# Patient Record
Sex: FEMALE | Race: WHITE | NOT HISPANIC OR LATINO | Employment: FULL TIME | URBAN - METROPOLITAN AREA
[De-identification: names, ages, dates, MRNs, and addresses within clinical notes are randomized per-mention and may not be internally consistent; named-entity substitution may affect disease eponyms.]

---

## 2023-08-31 ENCOUNTER — APPOINTMENT (EMERGENCY)
Dept: CT IMAGING | Facility: HOSPITAL | Age: 38
End: 2023-08-31
Payer: COMMERCIAL

## 2023-08-31 ENCOUNTER — HOSPITAL ENCOUNTER (EMERGENCY)
Facility: HOSPITAL | Age: 38
Discharge: HOME | End: 2023-09-01
Attending: EMERGENCY MEDICINE | Admitting: EMERGENCY MEDICINE
Payer: COMMERCIAL

## 2023-08-31 ENCOUNTER — OFFICE VISIT (OUTPATIENT)
Dept: URGENT CARE | Facility: CLINIC | Age: 38
End: 2023-08-31
Payer: COMMERCIAL

## 2023-08-31 VITALS
TEMPERATURE: 99.8 F | OXYGEN SATURATION: 98 % | WEIGHT: 144.6 LBS | HEIGHT: 62 IN | HEART RATE: 88 BPM | BODY MASS INDEX: 26.61 KG/M2 | RESPIRATION RATE: 18 BRPM

## 2023-08-31 VITALS
WEIGHT: 144.62 LBS | RESPIRATION RATE: 16 BRPM | TEMPERATURE: 98.2 F | HEIGHT: 62 IN | OXYGEN SATURATION: 98 % | HEART RATE: 71 BPM | DIASTOLIC BLOOD PRESSURE: 81 MMHG | BODY MASS INDEX: 26.61 KG/M2 | SYSTOLIC BLOOD PRESSURE: 137 MMHG

## 2023-08-31 DIAGNOSIS — R10.9 ABDOMINAL PAIN, UNSPECIFIED ABDOMINAL LOCATION: Primary | ICD-10-CM

## 2023-08-31 DIAGNOSIS — R10.9 ABDOMINAL PAIN: Primary | ICD-10-CM

## 2023-08-31 DIAGNOSIS — R93.5 ABNORMAL CT OF THE ABDOMEN: ICD-10-CM

## 2023-08-31 LAB
ALBUMIN SERPL BCP-MCNC: 4.1 G/DL (ref 3.5–5)
ALP SERPL-CCNC: 89 U/L (ref 34–104)
ALT SERPL W P-5'-P-CCNC: 19 U/L (ref 7–52)
ANION GAP SERPL CALCULATED.3IONS-SCNC: 9 MMOL/L
AST SERPL W P-5'-P-CCNC: 14 U/L (ref 13–39)
BASOPHILS # BLD AUTO: 0.03 THOUSANDS/ÂΜL (ref 0–0.1)
BASOPHILS NFR BLD AUTO: 0 % (ref 0–1)
BILIRUB SERPL-MCNC: 0.45 MG/DL (ref 0.2–1)
BUN SERPL-MCNC: 8 MG/DL (ref 5–25)
CALCIUM SERPL-MCNC: 9 MG/DL (ref 8.4–10.2)
CHLORIDE SERPL-SCNC: 105 MMOL/L (ref 96–108)
CO2 SERPL-SCNC: 22 MMOL/L (ref 21–32)
CREAT SERPL-MCNC: 0.64 MG/DL (ref 0.6–1.3)
EOSINOPHIL # BLD AUTO: 0.11 THOUSAND/ÂΜL (ref 0–0.61)
EOSINOPHIL NFR BLD AUTO: 1 % (ref 0–6)
ERYTHROCYTE [DISTWIDTH] IN BLOOD BY AUTOMATED COUNT: 12.2 % (ref 11.6–15.1)
EXT PREGNANCY TEST URINE: NEGATIVE
EXT. CONTROL: NORMAL
GFR SERPL CREATININE-BSD FRML MDRD: 115 ML/MIN/1.73SQ M
GLUCOSE SERPL-MCNC: 90 MG/DL (ref 65–140)
HCT VFR BLD AUTO: 43.1 % (ref 34.8–46.1)
HGB BLD-MCNC: 14.5 G/DL (ref 11.5–15.4)
IMM GRANULOCYTES # BLD AUTO: 0.03 THOUSAND/UL (ref 0–0.2)
IMM GRANULOCYTES NFR BLD AUTO: 0 % (ref 0–2)
LIPASE SERPL-CCNC: <6 U/L (ref 11–82)
LYMPHOCYTES # BLD AUTO: 0.67 THOUSANDS/ÂΜL (ref 0.6–4.47)
LYMPHOCYTES NFR BLD AUTO: 8 % (ref 14–44)
MCH RBC QN AUTO: 30.5 PG (ref 26.8–34.3)
MCHC RBC AUTO-ENTMCNC: 33.6 G/DL (ref 31.4–37.4)
MCV RBC AUTO: 91 FL (ref 82–98)
MONOCYTES # BLD AUTO: 0.72 THOUSAND/ÂΜL (ref 0.17–1.22)
MONOCYTES NFR BLD AUTO: 8 % (ref 4–12)
NEUTROPHILS # BLD AUTO: 7.23 THOUSANDS/ÂΜL (ref 1.85–7.62)
NEUTS SEG NFR BLD AUTO: 83 % (ref 43–75)
NRBC BLD AUTO-RTO: 0 /100 WBCS
PLATELET # BLD AUTO: 315 THOUSANDS/UL (ref 149–390)
PMV BLD AUTO: 8.9 FL (ref 8.9–12.7)
POTASSIUM SERPL-SCNC: 3.6 MMOL/L (ref 3.5–5.3)
PROT SERPL-MCNC: 7.2 G/DL (ref 6.4–8.4)
RBC # BLD AUTO: 4.76 MILLION/UL (ref 3.81–5.12)
SL AMB  POCT GLUCOSE, UA: NORMAL
SL AMB LEUKOCYTE ESTERASE,UA: NORMAL
SL AMB POCT BILIRUBIN,UA: NORMAL
SL AMB POCT BLOOD,UA: NORMAL
SL AMB POCT CLARITY,UA: CLEAR
SL AMB POCT COLOR,UA: NORMAL
SL AMB POCT KETONES,UA: NORMAL
SL AMB POCT NITRITE,UA: NORMAL
SL AMB POCT PH,UA: 6
SL AMB POCT SPECIFIC GRAVITY,UA: 1.02
SL AMB POCT URINE HCG: NORMAL
SL AMB POCT URINE PROTEIN: NORMAL
SL AMB POCT UROBILINOGEN: 0.2
SODIUM SERPL-SCNC: 136 MMOL/L (ref 135–147)
WBC # BLD AUTO: 8.79 THOUSAND/UL (ref 4.31–10.16)

## 2023-08-31 PROCEDURE — 74177 CT ABD & PELVIS W/CONTRAST: CPT

## 2023-08-31 PROCEDURE — 99285 EMERGENCY DEPT VISIT HI MDM: CPT | Performed by: PHYSICIAN ASSISTANT

## 2023-08-31 PROCEDURE — 99215 OFFICE O/P EST HI 40 MIN: CPT | Performed by: PHYSICIAN ASSISTANT

## 2023-08-31 PROCEDURE — 81002 URINALYSIS NONAUTO W/O SCOPE: CPT | Performed by: PHYSICIAN ASSISTANT

## 2023-08-31 PROCEDURE — 81025 URINE PREGNANCY TEST: CPT | Performed by: PHYSICIAN ASSISTANT

## 2023-08-31 PROCEDURE — 80053 COMPREHEN METABOLIC PANEL: CPT | Performed by: PHYSICIAN ASSISTANT

## 2023-08-31 PROCEDURE — 85025 COMPLETE CBC W/AUTO DIFF WBC: CPT | Performed by: PHYSICIAN ASSISTANT

## 2023-08-31 PROCEDURE — 83690 ASSAY OF LIPASE: CPT | Performed by: PHYSICIAN ASSISTANT

## 2023-08-31 PROCEDURE — 36415 COLL VENOUS BLD VENIPUNCTURE: CPT | Performed by: PHYSICIAN ASSISTANT

## 2023-08-31 PROCEDURE — 99284 EMERGENCY DEPT VISIT MOD MDM: CPT

## 2023-08-31 RX ORDER — PANTOPRAZOLE SODIUM 40 MG/1
40 TABLET, DELAYED RELEASE ORAL ONCE
Status: COMPLETED | OUTPATIENT
Start: 2023-09-01 | End: 2023-08-31

## 2023-08-31 RX ORDER — NORGESTIMATE AND ETHINYL ESTRADIOL 7DAYSX3 28
KIT ORAL
COMMUNITY
Start: 2023-08-30

## 2023-08-31 RX ORDER — DICYCLOMINE HCL 20 MG
20 TABLET ORAL ONCE
Status: COMPLETED | OUTPATIENT
Start: 2023-09-01 | End: 2023-08-31

## 2023-08-31 RX ORDER — METHOCARBAMOL 500 MG/1
500 TABLET, FILM COATED ORAL 3 TIMES DAILY PRN
Qty: 21 TABLET | Refills: 0 | Status: SHIPPED | OUTPATIENT
Start: 2023-08-31

## 2023-08-31 RX ORDER — DICYCLOMINE HCL 20 MG
20 TABLET ORAL EVERY 6 HOURS PRN
Qty: 20 TABLET | Refills: 0 | Status: SHIPPED | OUTPATIENT
Start: 2023-08-31

## 2023-08-31 RX ORDER — PANTOPRAZOLE SODIUM 40 MG/1
40 TABLET, DELAYED RELEASE ORAL DAILY
Qty: 30 TABLET | Refills: 0 | Status: SHIPPED | OUTPATIENT
Start: 2023-08-31

## 2023-08-31 RX ADMIN — IOHEXOL 100 ML: 350 INJECTION, SOLUTION INTRAVENOUS at 21:14

## 2023-08-31 RX ADMIN — DICYCLOMINE HYDROCHLORIDE 20 MG: 20 TABLET ORAL at 23:58

## 2023-08-31 RX ADMIN — PANTOPRAZOLE SODIUM 40 MG: 40 TABLET, DELAYED RELEASE ORAL at 23:58
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
## 2023-08-31 NOTE — PROGRESS NOTES
[facility]        NAME: Charley Fernandez is a 30 y.o. female  : 1985    MRN: [id number]  DATE: 2023  TIME: 7:34 PM      Assessment and Plan     Abdominal pain, unspecified abdominal location [R10.9]  1. Abdominal pain, unspecified abdominal location  POCT urine dip    POCT urine HCG    Transfer to other facility        Note:   U/A and Urine HCG in office negative. Sent patient to ER for further evaluation. Patient Instructions   There are no Patient Instructions on file for this visit. Follow up with PCP in 3-5 days. Go to ER if symptoms worsen. Chief Complaint     Chief Complaint   Patient presents with   • Abdominal Pain     Patient reported that she has been having abdominal pain for two weeks. Patient reported that it starts in her abd and radiates around to her back, then up her back. Patient reported that she notices she's been having difficult BM, tried taking a stool softner, which resulted in diarrhea. Patient reported that she has been gluten free for 3 years now, and believes she is gluten intolerant. She reports feeling nauseous. She also reports feeling lightheaded. History of Present Illness     Patient presents with abdominal pain x 2 weeks. She states the pain is lower and radiates to her lower back and up her spine. She states Advil, Aleve, and a heating pad were initially helping, but isn't now. She also tried a laxative, thinking it might be constipation. She states she feels bloated as well. The pain waxes and wanes in intensity and is not associated with meals. She also complains of nausea, feeling feverish, and sweats. She denies urinary complaints and vomiting. She states she is sexually active with one partner and is on birth control pills. Review of Systems     Review of Systems   Constitutional: Positive for diaphoresis and fever. Negative for chills and fatigue.    HENT: Negative for congestion, ear pain, postnasal drip, rhinorrhea, sinus pressure, sinus pain and sore throat. Eyes: Negative for pain and visual disturbance. Respiratory: Negative for cough, chest tightness and shortness of breath. Cardiovascular: Negative for chest pain and palpitations. Gastrointestinal: Positive for abdominal distention, abdominal pain and nausea. Negative for blood in stool, constipation, diarrhea and vomiting. Genitourinary: Negative for dysuria and hematuria. Musculoskeletal: Negative for arthralgias, back pain and myalgias. Skin: Negative for rash. Neurological: Negative for dizziness, seizures, syncope, numbness and headaches. All other systems reviewed and are negative. Current Medications       Current Outpatient Medications:   •  norgestimate-ethinyl estradiol (ORTHO TRI-CYCLEN,TRINESSA) 0.18/0.215/0.25 MG-35 MCG per tablet, , Disp: , Rfl:     Current Allergies     Allergies as of 08/31/2023   • (No Known Allergies)              The following portions of the patient's history were reviewed and updated as appropriate: allergies, current medications, past family history, past medical history, past social history, past surgical history, and problem list.     Past Medical History:   Diagnosis Date   • Gluten intolerance        Past Surgical History:   Procedure Laterality Date   • LAPAROSCOPIC OVARIAN CYSTECTOMY Right    • WISDOM TOOTH EXTRACTION         History reviewed. No pertinent family history. Medications have been verified. Objective     Pulse 88   Temp 99.8 °F (37.7 °C) (Tympanic)   Resp 18   Ht 5' 2" (1.575 m)   Wt 65.6 kg (144 lb 9.6 oz)   SpO2 98%   BMI 26.45 kg/m²   No LMP recorded. Physical Exam     Physical Exam  Vitals and nursing note reviewed. Constitutional:       Appearance: She is well-developed and normal weight. HENT:      Head: Normocephalic and atraumatic. Cardiovascular:      Rate and Rhythm: Normal rate and regular rhythm. Heart sounds: Normal heart sounds. Pulmonary:      Effort: Pulmonary effort is normal.      Breath sounds: Normal breath sounds. Abdominal:      General: Abdomen is flat. Bowel sounds are normal.      Palpations: Abdomen is soft. There is no hepatomegaly or splenomegaly. Tenderness: There is abdominal tenderness in the right lower quadrant, periumbilical area and left lower quadrant. There is no right CVA tenderness, left CVA tenderness, guarding or rebound. Positive signs include McBurney's sign. Negative signs include Sneed's sign, Rovsing's sign, psoas sign and obturator sign. Skin:     General: Skin is warm and dry. Neurological:      General: No focal deficit present. Mental Status: She is alert and oriented to person, place, and time.    Psychiatric:         Mood and Affect: Mood normal.         Behavior: Behavior normal.

## 2023-09-01 NOTE — DISCHARGE INSTRUCTIONS
Take pantoprazole as prescribed. Take Bentyl as needed for abdominal cramping. Take Robaxin as needed for spasms. Please call your family doctor tomorrow. You will need to see gastroenterology for further testing of your abnormal CT scan findings. Your CT results:  Irregular wall thickening involving the distal gastric body and antrum with adjacent inflammatory changes and lymphadenopathy as above. Findings may reflect infectious versus inflammatory gastritis. However in addition there are numerous tiny hypodense lesions throughout the liver and spleen of uncertain etiology; does the patient have history of granulomatous disease/TB? [facility] These were not reported on prior outside CT examination dated 5/12/2022 per review in the electronic medical record. Given   constellation of these findings underlying neoplastic etiologies i.e. gastric adenocarcinoma with metastatic disease versus? Lymphoma are in the differential amongst other considerations. GI consult and further clinical work-up advised.

## 2023-09-11 ENCOUNTER — OFFICE VISIT (OUTPATIENT)
Dept: GASTROENTEROLOGY | Facility: AMBULARY SURGERY CENTER | Age: 38
End: 2023-09-11
Payer: COMMERCIAL

## 2023-09-11 ENCOUNTER — APPOINTMENT (OUTPATIENT)
Dept: LAB | Facility: AMBULARY SURGERY CENTER | Age: 38
End: 2023-09-11
Payer: COMMERCIAL

## 2023-09-11 VITALS
OXYGEN SATURATION: 99 % | SYSTOLIC BLOOD PRESSURE: 128 MMHG | BODY MASS INDEX: 26.31 KG/M2 | HEIGHT: 62 IN | WEIGHT: 143 LBS | DIASTOLIC BLOOD PRESSURE: 84 MMHG | HEART RATE: 71 BPM

## 2023-09-11 DIAGNOSIS — K90.41 GLUTEN INTOLERANCE: ICD-10-CM

## 2023-09-11 DIAGNOSIS — R93.3 ABNORMAL CT SCAN, STOMACH: ICD-10-CM

## 2023-09-11 DIAGNOSIS — R10.9 ABDOMINAL PAIN: ICD-10-CM

## 2023-09-11 DIAGNOSIS — K76.9 LIVER LESION: ICD-10-CM

## 2023-09-11 DIAGNOSIS — D73.89 LESION OF SPLEEN: ICD-10-CM

## 2023-09-11 DIAGNOSIS — K90.41 GLUTEN INTOLERANCE: Primary | ICD-10-CM

## 2023-09-11 PROCEDURE — 82784 ASSAY IGA/IGD/IGG/IGM EACH: CPT

## 2023-09-11 PROCEDURE — 86364 TISS TRNSGLTMNASE EA IG CLAS: CPT

## 2023-09-11 PROCEDURE — 99244 OFF/OP CNSLTJ NEW/EST MOD 40: CPT | Performed by: INTERNAL MEDICINE

## 2023-09-11 PROCEDURE — 86258 DGP ANTIBODY EACH IG CLASS: CPT

## 2023-09-11 PROCEDURE — 86231 EMA EACH IG CLASS: CPT

## 2023-09-11 PROCEDURE — 36415 COLL VENOUS BLD VENIPUNCTURE: CPT

## 2023-09-11 RX ORDER — PANTOPRAZOLE SODIUM 40 MG/1
40 TABLET, DELAYED RELEASE ORAL DAILY
Qty: 90 TABLET | Refills: 1 | Status: SHIPPED | OUTPATIENT
Start: 2023-09-11 | End: 2023-12-10

## 2023-09-11 RX ORDER — LANSOPRAZOLE 30 MG/1
CAPSULE, DELAYED RELEASE ORAL
Refills: 0 | OUTPATIENT
Start: 2023-09-11

## 2023-09-11 NOTE — H&P (VIEW-ONLY)
Consultation - [facility]) [facility]  Perez Del Castillo 30 y.o. female MRN: [id number]  Unit/Bed#:  Encounter: [id number]        Consults    ASSESSMENT/PLAN:     1. Irregular appearance of the gastric antrum and body with adjacent inflammatory change in the lymphadenopathy: Secondary to peptic ulcer disease versus less likely malignancy.  -Would recommend urgent EGD. -Recommend to continue with pantoprazole 40 mg on daily basis, this appears to be effective.  -Avoid NSAIDs altogether.  -Follow non ulcerogenic diet.  -Check celiac serologies, patient reports being diagnosed with gluten intolerance however and has never been tested for celiac disease. 2.  Hypodense lesions throughout the liver and spleen: Possibly sarcoidosis versus tuberculosis versus microabscesses however this is unlikely given also present on CAT scan in May 2022. Patient has followed with [facility] hepatology last week and pulmonary and is scheduled for an MRI. -Follow-up the results of the MRI. -Follow-up with hepatology and pulmonary at [facility].      ______________________________________________________________________    Reason for Consult / Principal Problem: [unfilled]    HPI: Perez Del Castillo is a 30y.o. year old female with history of ovarian cystectomy, was recently in the hospital with abdominal pain. She underwent CT of abdomen and pelvis which was notable for irregular thickening of the distal gastric body and antrum with adjacent inflammatory changes and lymphadenopathy. Patient reports that she was experiencing epigastric abdominal pain for which she was taking Advil on Aleve around-the-clock. Patient reports that the pain got unbearable bringing her to the emergency room. She reports chronic use of NSAIDs for migraines. No melena or hematochezia. Patient reports that she was prescribed pantoprazole 40 mg in the ER and reports significant symptomatic improvement.   Patient reports that the pain has essentially subsided. She has not been taking NSAIDs. She has used Bentyl twice. Patient does report chronic constipation at baseline, does not take any fiber supplements, patient reports that she underwent colonoscopy 10 years ago for rectal bleeding which was essentially unremarkable. 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 On further review of her imaging, There is also evidence of small hypodense lesions throughout the liver and spleen of unknown etiology. These appear to be present since May 2022. Second read of the CAT scan was concerning for possible lymphomatous infiltrates versus multiple abscesses. MRI abdomen abdomen with and without contrast was recommended. This has not been done yet. Patient reports that she saw Pulmonary and hepatology at [facility] and has follow-up with him after the MRI. Patient reports having had a colonoscopy 10 years ago, does not recall having had a polyps. She has never had an EGD. Review of Systems: The remainder of the review of systems was negative except for the pertinent positives noted in HPI. Historical Information   Past Medical History:   Diagnosis Date    Gluten intolerance      Past Surgical History:   Procedure Laterality Date    LAPAROSCOPIC OVARIAN CYSTECTOMY Right     WISDOM TOOTH EXTRACTION       Social History   Social History     Substance and Sexual Activity   Alcohol Use Yes    Comment: occ     Social History     Substance and Sexual Activity   Drug Use Never     Social History     Tobacco Use   Smoking Status Never    Passive exposure: Past   Smokeless Tobacco Never     No family history on file. Meds/Allergies     (Not in a hospital admission)    No current facility-administered medications for this visit. No Known Allergies    Objective     There were no vitals taken for this visit.     [unfilled]    PHYSICAL EXAM     GEN: well nourished, well developed, no acute distress  HEENT: anicteric, MMM, no cervical or supraclavicular lymphadenopathy  CV: RRR, no m/r/g  CHEST: CTA b/l, no WRR  ABD: +BS, soft, NT/ND, no hepatosplenomegaly  EXT: no c/c/e  SKIN: no rashes,  NEURO: aaox3    Lab Results:   No visits with results within 1 Day(s) from this visit.    Latest known visit with results is:   Admission on 08/31/2023, Discharged on 09/01/2023   Component Date Value    WBC 08/31/2023 8.79     RBC 08/31/2023 4.76     Hemoglobin 08/31/2023 14.5     Hematocrit 08/31/2023 43.1     MCV 08/31/2023 91     MCH 08/31/2023 30.5     MCHC 08/31/2023 33.6     RDW 08/31/2023 12.2     MPV 08/31/2023 8.9     Platelets 73/54/5039 315     nRBC 08/31/2023 0     Neutrophils Relative 08/31/2023 83 (H)     Immat GRANS % 08/31/2023 0     Lymphocytes Relative 08/31/2023 8 (L)     Monocytes Relative 08/31/2023 8     Eosinophils Relative 08/31/2023 1     Basophils Relative 08/31/2023 0     Neutrophils Absolute 08/31/2023 7.23     Immature Grans Absolute 08/31/2023 0.03     Lymphocytes Absolute 08/31/2023 0.67     Monocytes Absolute 08/31/2023 0.72     Eosinophils Absolute 08/31/2023 0.11     Basophils Absolute 08/31/2023 0.03     Sodium 08/31/2023 136     Potassium 08/31/2023 3.6     Chloride 08/31/2023 105     CO2 08/31/2023 22     ANION GAP 08/31/2023 9     BUN 08/31/2023 8     Creatinine 08/31/2023 0.64     Glucose 08/31/2023 90     Calcium 08/31/2023 9.0     AST 08/31/2023 14     ALT 08/31/2023 19     Alkaline Phosphatase 08/31/2023 89     Total Protein 08/31/2023 7.2     Albumin 08/31/2023 4.1     Total Bilirubin 08/31/2023 0.45     eGFR 08/31/2023 115     Lipase 08/31/2023 <6 (L)     EXT Preg Test, Ur 08/31/2023 Negative     Control 08/31/2023 Valid      Imaging Studies: I have personally reviewed pertinent films in PACS                Answers submitted by the patient for this visit:  Abdominal Pain Questionnaire (Submitted on 9/11/2023)  Chief Complaint: Abdominal pain  Chronicity: new  Onset: 1 to 4 weeks ago  Onset quality: sudden  Frequency: constantly  Episode duration: 4 Weeks  Progression since onset: rapidly improving  Pain location: generalized abdominal region  Pain - numeric: 8/10  Pain quality: burning, cramping, a sensation of fullness, sharp  Radiates to: LLQ, LUQ, RLQ, RUQ, epigastric region, periumbilical region, suprapubic region, back  anorexia: Yes  arthralgias: No  belching: Yes  constipation: Yes  diarrhea: Yes  dysuria: No  fever: No  flatus: No  frequency: Yes  headaches: Yes  hematochezia: No  hematuria: No  melena: No  myalgias: No  nausea: Yes  weight loss: No  vomiting: No  Aggravated by: being still, certain positions  Relieved by: activity, belching, bowel movements, certain positions, eating, movement, palpation, passing flatus, recumbency, sitting up, standing  Diagnostic workup: CT scan

## 2023-09-11 NOTE — PROGRESS NOTES
Consultation - [address]  [facility]  Arsen Rojas 30 y.o. female MRN: [id number]  Unit/Bed#:  Encounter: [id number]        Consults    ASSESSMENT/PLAN:     1. Irregular appearance of the gastric antrum and body with adjacent inflammatory change in the lymphadenopathy: Secondary to peptic ulcer disease versus less likely malignancy.  -Would recommend urgent EGD. -Recommend to continue with pantoprazole 40 mg on daily basis, this appears to be effective.  -Avoid NSAIDs altogether.  -Follow non ulcerogenic diet.  -Check celiac serologies, patient reports being diagnosed with gluten intolerance however and has never been tested for celiac disease. 2.  Hypodense lesions throughout the liver and spleen: Possibly sarcoidosis versus tuberculosis versus microabscesses however this is unlikely given also present on CAT scan in May 2022. Patient has followed with [facility] hepatology last week and pulmonary and is scheduled for an MRI. -Follow-up the results of the MRI. -Follow-up with hepatology and pulmonary at [facility].      ______________________________________________________________________    Reason for Consult / Principal Problem: [unfilled]    HPI: Arsen Rojas is a 30y.o. year old female with history of ovarian cystectomy, was recently in the hospital with abdominal pain. She underwent CT of abdomen and pelvis which was notable for irregular thickening of the distal gastric body and antrum with adjacent inflammatory changes and lymphadenopathy. Patient reports that she was experiencing epigastric abdominal pain for which she was taking Advil on Aleve around-the-clock. Patient reports that the pain got unbearable bringing her to the emergency room. She reports chronic use of NSAIDs for migraines. No melena or hematochezia. Patient reports that she was prescribed pantoprazole 40 mg in the ER and reports significant symptomatic improvement.   Patient reports that the pain has essentially subsided. She has not been taking NSAIDs. She has used Bentyl twice. Patient does report chronic constipation at baseline, does not take any fiber supplements, patient reports that she underwent colonoscopy 10 years ago for rectal bleeding which was essentially unremarkable. 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 On further review of her imaging, There is also evidence of small hypodense lesions throughout the liver and spleen of unknown etiology. These appear to be present since May 2022. Second read of the CAT scan was concerning for possible lymphomatous infiltrates versus multiple abscesses. MRI abdomen abdomen with and without contrast was recommended. This has not been done yet. Patient reports that she saw Pulmonary and hepatology at [facility] and has follow-up with him after the MRI. Patient reports having had a colonoscopy 10 years ago, does not recall having had a polyps. She has never had an EGD. Review of Systems: The remainder of the review of systems was negative except for the pertinent positives noted in HPI. Historical Information   Past Medical History:   Diagnosis Date    Gluten intolerance      Past Surgical History:   Procedure Laterality Date    LAPAROSCOPIC OVARIAN CYSTECTOMY Right     WISDOM TOOTH EXTRACTION       Social History   Social History     Substance and Sexual Activity   Alcohol Use Yes    Comment: occ     Social History     Substance and Sexual Activity   Drug Use Never     Social History     Tobacco Use   Smoking Status Never    Passive exposure: Past   Smokeless Tobacco Never     No family history on file. Meds/Allergies     (Not in a hospital admission)    No current facility-administered medications for this visit. No Known Allergies    Objective     There were no vitals taken for this visit.     [unfilled]    PHYSICAL EXAM     GEN: well nourished, well developed, no acute distress  HEENT: anicteric, MMM, no cervical or supraclavicular lymphadenopathy  CV: RRR, no m/r/g  CHEST: CTA b/l, no WRR  ABD: +BS, soft, NT/ND, no hepatosplenomegaly  EXT: no c/c/e  SKIN: no rashes,  NEURO: aaox3    Lab Results:   No visits with results within 1 Day(s) from this visit.    Latest known visit with results is:   Admission on 08/31/2023, Discharged on 09/01/2023   Component Date Value    WBC 08/31/2023 8.79     RBC 08/31/2023 4.76     Hemoglobin 08/31/2023 14.5     Hematocrit 08/31/2023 43.1     MCV 08/31/2023 91     MCH 08/31/2023 30.5     MCHC 08/31/2023 33.6     RDW 08/31/2023 12.2     MPV 08/31/2023 8.9     Platelets 98/20/2037 315     nRBC 08/31/2023 0     Neutrophils Relative 08/31/2023 83 (H)     Immat GRANS % 08/31/2023 0     Lymphocytes Relative 08/31/2023 8 (L)     Monocytes Relative 08/31/2023 8     Eosinophils Relative 08/31/2023 1     Basophils Relative 08/31/2023 0     Neutrophils Absolute 08/31/2023 7.23     Immature Grans Absolute 08/31/2023 0.03     Lymphocytes Absolute 08/31/2023 0.67     Monocytes Absolute 08/31/2023 0.72     Eosinophils Absolute 08/31/2023 0.11     Basophils Absolute 08/31/2023 0.03     Sodium 08/31/2023 136     Potassium 08/31/2023 3.6     Chloride 08/31/2023 105     CO2 08/31/2023 22     ANION GAP 08/31/2023 9     BUN 08/31/2023 8     Creatinine 08/31/2023 0.64     Glucose 08/31/2023 90     Calcium 08/31/2023 9.0     AST 08/31/2023 14     ALT 08/31/2023 19     Alkaline Phosphatase 08/31/2023 89     Total Protein 08/31/2023 7.2     Albumin 08/31/2023 4.1     Total Bilirubin 08/31/2023 0.45     eGFR 08/31/2023 115     Lipase 08/31/2023 <6 (L)     EXT Preg Test, Ur 08/31/2023 Negative     Control 08/31/2023 Valid      Imaging Studies: I have personally reviewed pertinent films in PACS                Answers submitted by the patient for this visit:  Abdominal Pain Questionnaire (Submitted on 9/11/2023)  Chief Complaint: Abdominal pain  Chronicity: new  Onset: 1 to 4 weeks ago  Onset quality: sudden  Frequency: constantly  Episode duration: 4 Weeks  Progression since onset: rapidly improving  Pain location: generalized abdominal region  Pain - numeric: 8/10  Pain quality: burning, cramping, a sensation of fullness, sharp  Radiates to: LLQ, LUQ, RLQ, RUQ, epigastric region, periumbilical region, suprapubic region, back  anorexia: Yes  arthralgias: No  belching: Yes  constipation: Yes  diarrhea: Yes  dysuria: No  fever: No  flatus: No  frequency: Yes  headaches: Yes  hematochezia: No  hematuria: No  melena: No  myalgias: No  nausea: Yes  weight loss: No  vomiting: No  Aggravated by: being still, certain positions  Relieved by: activity, belching, bowel movements, certain positions, eating, movement, palpation, passing flatus, recumbency, sitting up, standing  Diagnostic workup: CT scan

## 2023-09-11 NOTE — PATIENT INSTRUCTIONS
Scheduled date of EGD(as of today):  09/15/23  Physician performing EGD:  Dr Anoop Zhang  Location of EGD:  [facility]   Instructions reviewed with patient by:  Pam SOUTH  Clearances:  n/a

## 2023-09-12 LAB
ENDOMYSIUM IGA SER QL: NEGATIVE
GLIADIN PEPTIDE IGA SER-ACNC: 8 UNITS (ref 0–19)
GLIADIN PEPTIDE IGG SER-ACNC: 6 UNITS (ref 0–19)
IGA SERPL-MCNC: 109 MG/DL (ref 87–352)
TTG IGA SER-ACNC: <2 U/ML (ref 0–3)
TTG IGG SER-ACNC: 8 U/ML (ref 0–5)

## 2023-09-13 ENCOUNTER — OFFICE VISIT (OUTPATIENT)
Dept: URGENT CARE | Facility: CLINIC | Age: 38
End: 2023-09-13
Payer: COMMERCIAL

## 2023-09-13 VITALS — HEART RATE: 84 BPM | OXYGEN SATURATION: 99 % | RESPIRATION RATE: 17 BRPM

## 2023-09-13 DIAGNOSIS — N39.0 URINARY TRACT INFECTION WITH HEMATURIA, SITE UNSPECIFIED: Primary | ICD-10-CM

## 2023-09-13 DIAGNOSIS — R39.9 UTI SYMPTOMS: ICD-10-CM

## 2023-09-13 DIAGNOSIS — R31.9 URINARY TRACT INFECTION WITH HEMATURIA, SITE UNSPECIFIED: Primary | ICD-10-CM

## 2023-09-13 LAB
SL AMB  POCT GLUCOSE, UA: ABNORMAL
SL AMB LEUKOCYTE ESTERASE,UA: ABNORMAL
SL AMB POCT BILIRUBIN,UA: ABNORMAL
SL AMB POCT BLOOD,UA: ABNORMAL
SL AMB POCT CLARITY,UA: CLEAR
SL AMB POCT COLOR,UA: ABNORMAL
SL AMB POCT KETONES,UA: ABNORMAL
SL AMB POCT NITRITE,UA: ABNORMAL
SL AMB POCT PH,UA: 6
SL AMB POCT SPECIFIC GRAVITY,UA: 1.01
SL AMB POCT URINE PROTEIN: ABNORMAL
SL AMB POCT UROBILINOGEN: 0.2

## 2023-09-13 PROCEDURE — 87186 SC STD MICRODIL/AGAR DIL: CPT | Performed by: PHYSICIAN ASSISTANT

## 2023-09-13 PROCEDURE — 87077 CULTURE AEROBIC IDENTIFY: CPT | Performed by: PHYSICIAN ASSISTANT

## 2023-09-13 PROCEDURE — 81002 URINALYSIS NONAUTO W/O SCOPE: CPT | Performed by: PHYSICIAN ASSISTANT

## 2023-09-13 PROCEDURE — 87086 URINE CULTURE/COLONY COUNT: CPT | Performed by: PHYSICIAN ASSISTANT

## 2023-09-13 PROCEDURE — 99213 OFFICE O/P EST LOW 20 MIN: CPT | Performed by: PHYSICIAN ASSISTANT

## 2023-09-13 RX ORDER — NITROFURANTOIN 25; 75 MG/1; MG/1
100 CAPSULE ORAL 2 TIMES DAILY
Qty: 14 CAPSULE | Refills: 0 | Status: SHIPPED | OUTPATIENT
Start: 2023-09-13 | End: 2023-09-20

## 2023-09-13 NOTE — PROGRESS NOTES
[facility]        NAME: Shaji Bender is a 30 y.o. female  : 1985    MRN: [id number]  DATE: 2023  TIME: 6:16 PM    Assessment and Plan   Urinary tract infection with hematuria, site unspecified [N39.0, R31.9]  1. Urinary tract infection with hematuria, site unspecified  nitrofurantoin (MACROBID) 100 mg capsule      2. UTI symptoms  POCT urine dip    Urine culture        - POCT urine dip revealed trace leukocytes, positive nitrites, and large blood  - Urine culture sent   - Will begin treatment with Macrobid and modify if needed based on urine culture results     Patient Instructions       Follow up with PCP in 3-5 days. Proceed to  ER if symptoms worsen. Chief Complaint     Chief Complaint   Patient presents with   • Possible UTI         History of Present Illness       Patient is a 29 yo female who presents for evaluation of urinary discomfort and urgency x 2 days. She is currently being worked up by GI for abdominal pain she had been having. She denies fevers, chills, nausea, vomiting, abdominal pain, pelvic pain, flank pain. Review of Systems   Review of Systems   Constitutional: Negative for chills and fever. Gastrointestinal: Negative for abdominal pain, nausea and vomiting. Genitourinary: Negative for flank pain and pelvic pain.          Current Medications       Current Outpatient Medications:   •  nitrofurantoin (MACROBID) 100 mg capsule, Take 1 capsule (100 mg total) by mouth 2 (two) times a day for 7 days, Disp: 14 capsule, Rfl: 0  •  dicyclomine (BENTYL) 20 mg tablet, Take 1 tablet (20 mg total) by mouth every 6 (six) hours as needed (abdominal cramping) (Patient not taking: Reported on 2023), Disp: 20 tablet, Rfl: 0  •  methocarbamol (ROBAXIN) 500 mg tablet, Take 1 tablet (500 mg total) by mouth 3 (three) times a day as needed for muscle spasms (Patient not taking: Reported on 2023), Disp: 21 tablet, Rfl: 0  •  norgestimate-ethinyl estradiol (ORTHO TRI-CYCLEN,TRINESSA) 0.18/0.215/0.25 MG-35 MCG per tablet, , Disp: , Rfl:   •  pantoprazole (PROTONIX) 40 mg tablet, Take 1 tablet (40 mg total) by mouth daily, Disp: 90 tablet, Rfl: 1    Current Allergies     Allergies as of 09/13/2023 - Reviewed 09/11/2023   Allergen Reaction Noted   • Actical GI Intolerance, Itching, Rash, and Swelling 07/07/2017   • Gluten meal - food allergy Abdominal Pain, GI Intolerance, Itching, Rash, and Swelling 06/07/2017            The following portions of the patient's history were reviewed and updated as appropriate: allergies, current medications, past family history, past medical history, past social history, past surgical history and problem list.     Past Medical History:   Diagnosis Date   • Gluten intolerance        Past Surgical History:   Procedure Laterality Date   • COLONOSCOPY     • LAPAROSCOPIC OVARIAN CYSTECTOMY Right    • WISDOM TOOTH EXTRACTION         No family history on file. Medications have been verified. Objective   Pulse 84   Resp 17   SpO2 99%        Physical Exam     Physical Exam  Constitutional:       General: She is not in acute distress. Appearance: She is not toxic-appearing. Cardiovascular:      Rate and Rhythm: Normal rate. Pulmonary:      Effort: Pulmonary effort is normal.   Abdominal:      Palpations: Abdomen is soft. Tenderness: There is no abdominal tenderness. There is no right CVA tenderness, left CVA tenderness or guarding. Skin:     General: Skin is warm and dry. Neurological:      Mental Status: She is alert.    Psychiatric:         Mood and Affect: Mood normal.         Behavior: Behavior normal.

## 2023-09-15 ENCOUNTER — ANESTHESIA (OUTPATIENT)
Dept: GASTROENTEROLOGY | Facility: AMBULARY SURGERY CENTER | Age: 38
End: 2023-09-15

## 2023-09-15 ENCOUNTER — HOSPITAL ENCOUNTER (OUTPATIENT)
Dept: GASTROENTEROLOGY | Facility: AMBULARY SURGERY CENTER | Age: 38
Setting detail: OUTPATIENT SURGERY
End: 2023-09-15
Attending: INTERNAL MEDICINE
Payer: COMMERCIAL

## 2023-09-15 ENCOUNTER — ANESTHESIA EVENT (OUTPATIENT)
Dept: GASTROENTEROLOGY | Facility: AMBULARY SURGERY CENTER | Age: 38
End: 2023-09-15

## 2023-09-15 VITALS
HEIGHT: 62 IN | TEMPERATURE: 97.3 F | HEART RATE: 70 BPM | RESPIRATION RATE: 20 BRPM | OXYGEN SATURATION: 99 % | SYSTOLIC BLOOD PRESSURE: 130 MMHG | DIASTOLIC BLOOD PRESSURE: 67 MMHG | BODY MASS INDEX: 26.5 KG/M2 | WEIGHT: 144 LBS

## 2023-09-15 DIAGNOSIS — R93.3 ABNORMAL CT SCAN, STOMACH: ICD-10-CM

## 2023-09-15 DIAGNOSIS — R10.9 ABDOMINAL PAIN: ICD-10-CM

## 2023-09-15 LAB
BACTERIA UR CULT: ABNORMAL
EXT PREGNANCY TEST URINE: NEGATIVE
EXT. CONTROL: NORMAL

## 2023-09-15 PROCEDURE — 81025 URINE PREGNANCY TEST: CPT | Performed by: INTERNAL MEDICINE

## 2023-09-15 PROCEDURE — 88305 TISSUE EXAM BY PATHOLOGIST: CPT | Performed by: PATHOLOGY

## 2023-09-15 RX ORDER — LIDOCAINE HYDROCHLORIDE 10 MG/ML
INJECTION, SOLUTION EPIDURAL; INFILTRATION; INTRACAUDAL; PERINEURAL AS NEEDED
Status: DISCONTINUED | OUTPATIENT
Start: 2023-09-15 | End: 2023-09-15

## 2023-09-15 RX ORDER — SIMETHICONE 20 MG/.3ML
EMULSION ORAL AS NEEDED
Status: COMPLETED | OUTPATIENT
Start: 2023-09-15 | End: 2023-09-15

## 2023-09-15 RX ORDER — PROPOFOL 10 MG/ML
INJECTION, EMULSION INTRAVENOUS AS NEEDED
Status: DISCONTINUED | OUTPATIENT
Start: 2023-09-15 | End: 2023-09-15

## 2023-09-15 RX ORDER — SODIUM CHLORIDE, SODIUM LACTATE, POTASSIUM CHLORIDE, CALCIUM CHLORIDE 600; 310; 30; 20 MG/100ML; MG/100ML; MG/100ML; MG/100ML
INJECTION, SOLUTION INTRAVENOUS CONTINUOUS PRN
Status: DISCONTINUED | OUTPATIENT
Start: 2023-09-15 | End: 2023-09-15

## 2023-09-15 RX ADMIN — LIDOCAINE HYDROCHLORIDE 50 MG: 10 INJECTION, SOLUTION EPIDURAL; INFILTRATION; INTRACAUDAL; PERINEURAL at 09:52

## 2023-09-15 RX ADMIN — PROPOFOL 50 MG: 10 INJECTION, EMULSION INTRAVENOUS at 09:56

## 2023-09-15 RX ADMIN — PROPOFOL 170 MG: 10 INJECTION, EMULSION INTRAVENOUS at 09:52

## 2023-09-15 RX ADMIN — SODIUM CHLORIDE, SODIUM LACTATE, POTASSIUM CHLORIDE, AND CALCIUM CHLORIDE: .6; .31; .03; .02 INJECTION, SOLUTION INTRAVENOUS at 09:10

## 2023-09-15 RX ADMIN — Medication 40 MG: at 09:55

## 2023-09-15 NOTE — ANESTHESIA PREPROCEDURE EVALUATION
Procedure:  EGD    Relevant Problems   No relevant active problems        Physical Exam    Airway    Mallampati score: II  TM Distance: >3 FB  Neck ROM: full     Dental   No notable dental hx     Cardiovascular  Cardiovascular exam normal    Pulmonary  Pulmonary exam normal     Other Findings        Anesthesia Plan  ASA Score- 2     Anesthesia Type- IV sedation with anesthesia with ASA Monitors. Additional Monitors:   Airway Plan:           Plan Factors-Exercise tolerance (METS): >4 METS. Chart reviewed. Patient summary reviewed. Patient is not a current smoker. Induction- intravenous. Postoperative Plan-     Informed Consent- Anesthetic plan and risks discussed with patient.

## 2023-09-15 NOTE — ANESTHESIA POSTPROCEDURE EVALUATION
Post-Op Assessment Note    CV Status:  Stable  Pain Score: 0    Pain management: adequate     Mental Status:  Sleepy   Hydration Status:  Euvolemic   PONV Controlled:  Controlled   Airway Patency:  Patent      Post Op Vitals Reviewed: Yes      Staff: Anesthesiologist, CRNA         No notable events documented.     /80 (09/15/23 0959)    Temp      Pulse 98 (09/15/23 0959)   Resp 12 (09/15/23 0959)    SpO2 99 % (09/15/23 0959)

## 2023-09-15 NOTE — INTERVAL H&P NOTE
H&P reviewed. After examining the patient I find no changes in the patients condition since the H&P had been written.     Vitals:    09/15/23 0914   BP: 133/79   Pulse: 68   Resp: 16   Temp: 97.8 °F (36.6 °C)   SpO2: 99%

## 2023-09-19 PROCEDURE — 88305 TISSUE EXAM BY PATHOLOGIST: CPT | Performed by: PATHOLOGY

## 2023-09-20 ENCOUNTER — HOSPITAL ENCOUNTER (OUTPATIENT)
Dept: MRI IMAGING | Facility: HOSPITAL | Age: 38
Discharge: HOME | End: 2023-09-20
Payer: COMMERCIAL

## 2023-09-20 DIAGNOSIS — K76.9 LIVER LESION: ICD-10-CM

## 2023-09-20 PROCEDURE — 74183 MRI ABD W/O CNTR FLWD CNTR: CPT

## 2023-09-20 PROCEDURE — G1004 CDSM NDSC: HCPCS

## 2023-09-20 PROCEDURE — A9585 GADOBUTROL INJECTION: HCPCS | Performed by: RADIOLOGY

## 2023-09-20 RX ORDER — GADOBUTROL 604.72 MG/ML
6 INJECTION INTRAVENOUS
Status: COMPLETED | OUTPATIENT
Start: 2023-09-20 | End: 2023-09-20

## 2023-09-20 RX ADMIN — GADOBUTROL 6 ML: 604.72 INJECTION INTRAVENOUS at 06:58

## 2023-09-21 ENCOUNTER — TELEPHONE (OUTPATIENT)
Dept: GASTROENTEROLOGY | Facility: CLINIC | Age: 38
End: 2023-09-21

## 2023-09-21 NOTE — TELEPHONE ENCOUNTER
----- Message from Gary Bay MD sent at 9/20/2023  4:45 PM EDT -----  Repeat EGD in 2 to 3 months for large duodenal ulcer.

## 2023-09-25 NOTE — TELEPHONE ENCOUNTER
I lmom for pt to please call back to schedule a repeat EGD with Dr. Andreas Marcus. Will call pt again in one week if do not hear back from her.

## 2023-09-27 ENCOUNTER — TELEPHONE (OUTPATIENT)
Age: 38
End: 2023-09-27

## 2023-09-27 NOTE — TELEPHONE ENCOUNTER
Scheduled date of EGD(as of today): 11/30/2023  Physician performing EGD: DR BEATTY [facility]  Location of EGD[facility]

## 2023-11-16 ENCOUNTER — ANESTHESIA (OUTPATIENT)
Dept: ANESTHESIOLOGY | Facility: HOSPITAL | Age: 38
End: 2023-11-16

## 2023-11-16 ENCOUNTER — ANESTHESIA EVENT (OUTPATIENT)
Dept: ANESTHESIOLOGY | Facility: HOSPITAL | Age: 38
End: 2023-11-16

## 2023-11-30 ENCOUNTER — ANESTHESIA (OUTPATIENT)
Dept: GASTROENTEROLOGY | Facility: AMBULARY SURGERY CENTER | Age: 38
End: 2023-11-30

## 2023-11-30 ENCOUNTER — ANESTHESIA EVENT (OUTPATIENT)
Dept: GASTROENTEROLOGY | Facility: AMBULARY SURGERY CENTER | Age: 38
End: 2023-11-30

## 2023-11-30 ENCOUNTER — HOSPITAL ENCOUNTER (OUTPATIENT)
Dept: GASTROENTEROLOGY | Facility: AMBULARY SURGERY CENTER | Age: 38
Setting detail: OUTPATIENT SURGERY
End: 2023-11-30
Attending: INTERNAL MEDICINE
Payer: COMMERCIAL

## 2023-11-30 VITALS
OXYGEN SATURATION: 100 % | BODY MASS INDEX: 26.31 KG/M2 | RESPIRATION RATE: 18 BRPM | HEIGHT: 62 IN | WEIGHT: 143 LBS | TEMPERATURE: 98.8 F | SYSTOLIC BLOOD PRESSURE: 133 MMHG | DIASTOLIC BLOOD PRESSURE: 78 MMHG | HEART RATE: 62 BPM

## 2023-11-30 DIAGNOSIS — K26.9 DUODENAL ULCER: ICD-10-CM

## 2023-11-30 PROBLEM — M25.50 ARTHRALGIA: Status: ACTIVE | Noted: 2023-10-09

## 2023-11-30 PROBLEM — R59.0 ABDOMINAL LYMPHADENOPATHY: Status: ACTIVE | Noted: 2023-09-01

## 2023-11-30 PROBLEM — K31.89 GASTRIC WALL THICKENING: Status: ACTIVE | Noted: 2023-09-01

## 2023-11-30 PROBLEM — G43.109 MIGRAINE WITH AURA: Status: ACTIVE | Noted: 2023-11-30

## 2023-11-30 PROBLEM — R14.0 ABDOMINAL BLOATING: Status: ACTIVE | Noted: 2023-09-01

## 2023-11-30 PROBLEM — R10.9 ABDOMINAL PAIN: Status: ACTIVE | Noted: 2023-09-01

## 2023-11-30 PROBLEM — R59.0 MEDIASTINAL LYMPHADENOPATHY: Status: ACTIVE | Noted: 2023-11-16

## 2023-11-30 PROBLEM — D86.9 SARCOIDOSIS: Status: ACTIVE | Noted: 2023-10-09

## 2023-11-30 PROBLEM — K25.9 GASTRIC ULCER: Status: ACTIVE | Noted: 2023-10-09

## 2023-11-30 PROBLEM — R93.2 ABNORMAL LIVER CT: Status: ACTIVE | Noted: 2023-09-01

## 2023-11-30 LAB
EXT PREGNANCY TEST URINE: NEGATIVE
EXT. CONTROL: NORMAL

## 2023-11-30 PROCEDURE — 88305 TISSUE EXAM BY PATHOLOGIST: CPT | Performed by: PATHOLOGY

## 2023-11-30 PROCEDURE — 81025 URINE PREGNANCY TEST: CPT | Performed by: ANESTHESIOLOGY

## 2023-11-30 PROCEDURE — 43239 EGD BIOPSY SINGLE/MULTIPLE: CPT | Performed by: INTERNAL MEDICINE

## 2023-11-30 RX ORDER — SODIUM CHLORIDE, SODIUM LACTATE, POTASSIUM CHLORIDE, CALCIUM CHLORIDE 600; 310; 30; 20 MG/100ML; MG/100ML; MG/100ML; MG/100ML
INJECTION, SOLUTION INTRAVENOUS CONTINUOUS PRN
Status: DISCONTINUED | OUTPATIENT
Start: 2023-11-30 | End: 2023-11-30

## 2023-11-30 RX ORDER — PROPOFOL 10 MG/ML
INJECTION, EMULSION INTRAVENOUS AS NEEDED
Status: DISCONTINUED | OUTPATIENT
Start: 2023-11-30 | End: 2023-11-30

## 2023-11-30 RX ORDER — LIDOCAINE HYDROCHLORIDE 10 MG/ML
INJECTION, SOLUTION EPIDURAL; INFILTRATION; INTRACAUDAL; PERINEURAL AS NEEDED
Status: DISCONTINUED | OUTPATIENT
Start: 2023-11-30 | End: 2023-11-30

## 2023-11-30 RX ADMIN — SODIUM CHLORIDE, SODIUM LACTATE, POTASSIUM CHLORIDE, AND CALCIUM CHLORIDE: .6; .31; .03; .02 INJECTION, SOLUTION INTRAVENOUS at 14:05

## 2023-11-30 RX ADMIN — LIDOCAINE HYDROCHLORIDE 50 MG: 10 INJECTION, SOLUTION EPIDURAL; INFILTRATION; INTRACAUDAL; PERINEURAL at 14:05

## 2023-11-30 RX ADMIN — PROPOFOL 50 MG: 10 INJECTION, EMULSION INTRAVENOUS at 14:09

## 2023-11-30 RX ADMIN — PROPOFOL 150 MG: 10 INJECTION, EMULSION INTRAVENOUS at 14:05

## 2023-11-30 RX ADMIN — PROPOFOL 50 MG: 10 INJECTION, EMULSION INTRAVENOUS at 14:06

## 2023-11-30 RX ADMIN — PROPOFOL 50 MG: 10 INJECTION, EMULSION INTRAVENOUS at 14:08

## 2023-11-30 NOTE — ANESTHESIA POSTPROCEDURE EVALUATION
Post-Op Assessment Note    CV Status:  Stable  Pain Score: 0    Pain management: adequate       Mental Status:  Alert and awake   Hydration Status:  Euvolemic   PONV Controlled:  Controlled   Airway Patency:  Patent     Post Op Vitals Reviewed: Yes    No anethesia notable event occurred.     Staff: CRNA               BP   125/65   Temp   97.2   Pulse  100   Resp   16   SpO2   100

## 2023-11-30 NOTE — ANESTHESIA PREPROCEDURE EVALUATION
Procedure:  EGD     - hx of sarcoidosis, normal PFTs in 2022    Relevant Problems   ANESTHESIA (within normal limits)      CARDIO (within normal limits)      ENDO (within normal limits)      GI/HEPATIC (within normal limits)   (+) Gastric ulcer      /RENAL (within normal limits)      GYN (within normal limits)      HEMATOLOGY (within normal limits)      MUSCULOSKELETAL (within normal limits)      NEURO/PSYCH (within normal limits)      PULMONARY (within normal limits)      Digestive   (+) Gastric wall thickening      Other   (+) Abdominal bloating   (+) Arthralgia   (+) Mediastinal lymphadenopathy   (+) Sarcoidosis      Lab Results   Component Value Date    WBC 8.79 08/31/2023    HGB 14.5 08/31/2023    HCT 43.1 08/31/2023    MCV 91 08/31/2023     08/31/2023     Lab Results   Component Value Date    SODIUM 136 08/31/2023    K 3.6 08/31/2023     08/31/2023    CO2 22 08/31/2023    AGAP 9 08/31/2023    BUN 8 08/31/2023    CREATININE 0.64 08/31/2023    GLUC 90 08/31/2023    CALCIUM 9.0 08/31/2023    AST 14 08/31/2023    ALT 19 08/31/2023    ALKPHOS 89 08/31/2023    TP 7.2 08/31/2023    TBILI 0.45 08/31/2023    EGFR 115 08/31/2023     No results found for: "PTT"  No results found for: "INR", "PROTIME"      Physical Exam    Airway    Mallampati score: II  TM Distance: >3 FB  Neck ROM: full     Dental   No notable dental hx     Cardiovascular  Rhythm: regular, Rate: normal, Cardiovascular exam normal    Pulmonary  Pulmonary exam normal     Other Findings  post-pubertal.      Anesthesia Plan  ASA Score- 2     Anesthesia Type- IV sedation with anesthesia with ASA Monitors. Additional Monitors:     Airway Plan:            Plan Factors-Exercise tolerance (METS): >4 METS. Chart reviewed. EKG reviewed. Imaging results reviewed. Existing labs reviewed. Patient summary reviewed. Obstructive sleep apnea risk education given perioperatively. Induction- intravenous.     Postoperative Plan- Informed Consent- Anesthetic plan and risks discussed with patient. I personally reviewed this patient with the CRNA. Discussed and agreed on the Anesthesia Plan with the CRNA. Temo Sigala

## 2023-11-30 NOTE — H&P
History and Physical -  [facility]  Yusuf Guevara 41 y.o. female MRN: [id number]    HPI: Yusuf Guevara is a 41y.o. year old female who presents for evaluation of peptic ulcer disease. Review of Systems    Historical Information   Past Medical History:   Diagnosis Date    Gluten intolerance      Past Surgical History:   Procedure Laterality Date    COLONOSCOPY      EGD      LAPAROSCOPIC OVARIAN CYSTECTOMY Right     WISDOM TOOTH EXTRACTION       Social History   Social History     Substance and Sexual Activity   Alcohol Use Yes    Comment: occ     Social History     Substance and Sexual Activity   Drug Use Never     Social History     Tobacco Use   Smoking Status Never    Passive exposure: Past   Smokeless Tobacco Never     History reviewed. No pertinent family history. Meds/Allergies     (Not in a hospital admission)      Allergies   Allergen Reactions    Actical GI Intolerance, Itching, Rash and Swelling     Other reaction(s): GI Intolerance    Gluten Meal - Food Allergy Abdominal Pain, GI Intolerance, Itching, Rash and Swelling       Objective     /83   Pulse 60   Temp 97.7 °F (36.5 °C) (Temporal)   Resp 14   Ht 5' 2" (1.575 m)   Wt 64.9 kg (143 lb)   LMP 10/30/2023 (Exact Date)   SpO2 99%   BMI 26.16 kg/m²       PHYSICAL EXAM    Gen: NAD  CV: RRR  CHEST: Clear  ABD: soft, NT/ND  EXT: no edema  Neuro: AAO      ASSESSMENT/PLAN:  This is a 41y.o. year old female here for evaluation of peptic ulcer disease. PLAN:   Procedure: EGD.

## 2023-12-05 PROCEDURE — 88305 TISSUE EXAM BY PATHOLOGIST: CPT | Performed by: PATHOLOGY

## 2023-12-20 DIAGNOSIS — R10.9 ABDOMINAL PAIN: ICD-10-CM

## 2023-12-20 RX ORDER — PANTOPRAZOLE SODIUM 40 MG/1
40 TABLET, DELAYED RELEASE ORAL DAILY
Qty: 90 TABLET | Refills: 1 | Status: SHIPPED | OUTPATIENT
Start: 2023-12-20 | End: 2024-03-19

## 2023-12-20 NOTE — TELEPHONE ENCOUNTER
Reason for call:   [x] Refill   [] Prior Auth  [] Other:     Office:   [] PCP/Provider -   [x] Specialty/Provider - Gastro    Medication: pantoprazole (PROTONIX)     Dose/Frequency:     40 mg, Oral, Daily       Quantity: 90    Pharmacy:  [facility] #[id number] - [place] - [address]     Does the patient have enough for 3 days?   [] Yes   [x] No - Send as HP to POD

## 2024-03-20 ENCOUNTER — HOSPITAL ENCOUNTER (OUTPATIENT)
Dept: CT IMAGING | Facility: HOSPITAL | Age: 39
Discharge: HOME | End: 2024-03-20
Payer: COMMERCIAL

## 2024-03-20 DIAGNOSIS — K76.9 LIVER DISEASE, UNSPECIFIED: ICD-10-CM

## 2024-03-20 DIAGNOSIS — R59.1 GENERALIZED ENLARGED LYMPH NODES: ICD-10-CM

## 2024-03-20 PROCEDURE — G1004 CDSM NDSC: HCPCS

## 2024-03-20 PROCEDURE — 74177 CT ABD & PELVIS W/CONTRAST: CPT

## 2024-03-20 RX ADMIN — IOHEXOL 100 ML: 350 INJECTION, SOLUTION INTRAVENOUS at 07:04

## 2024-06-13 DIAGNOSIS — R10.9 ABDOMINAL PAIN: ICD-10-CM

## 2024-06-13 RX ORDER — PANTOPRAZOLE SODIUM 40 MG/1
40 TABLET, DELAYED RELEASE ORAL DAILY
Qty: 90 TABLET | Refills: 0 | Status: SHIPPED | OUTPATIENT
Start: 2024-06-13 | End: 2024-09-11

## 2024-09-04 DIAGNOSIS — R10.9 ABDOMINAL PAIN: ICD-10-CM

## 2024-09-05 RX ORDER — PANTOPRAZOLE SODIUM 40 MG/1
40 TABLET, DELAYED RELEASE ORAL DAILY
Qty: 90 TABLET | Refills: 0 | Status: SHIPPED | OUTPATIENT
Start: 2024-09-05

## 2024-09-05 NOTE — TELEPHONE ENCOUNTER
Please call and schedule follow-up appointment for patient.  Prescription was refilled but she will need to be seen in office for continued medication refills last seen November 2023.  Thank you

## 2024-09-12 ENCOUNTER — TELEPHONE (OUTPATIENT)
Dept: GASTROENTEROLOGY | Facility: CLINIC | Age: 39
End: 2024-09-12

## 2024-09-12 NOTE — TELEPHONE ENCOUNTER
Called  pt to schedule fu ov for Protonix refills.  Left a message on vm for the pt to please return the call.

## 2024-09-19 ENCOUNTER — TELEPHONE (OUTPATIENT)
Dept: GASTROENTEROLOGY | Facility: CLINIC | Age: 39
End: 2024-09-19

## 2024-09-19 NOTE — TELEPHONE ENCOUNTER
3rd attempt to call pt to schedule fu ov to continue the Protonix refills. Pt has not been seen in the office since 11/2023. I will also send her a reminder letter.

## 2024-11-20 DIAGNOSIS — R10.9 ABDOMINAL PAIN: ICD-10-CM

## 2024-11-20 RX ORDER — PANTOPRAZOLE SODIUM 40 MG/1
40 TABLET, DELAYED RELEASE ORAL DAILY
Qty: 90 TABLET | Refills: 1 | Status: SHIPPED | OUTPATIENT
Start: 2024-11-20

## 2025-02-11 DIAGNOSIS — R10.9 ABDOMINAL PAIN: ICD-10-CM

## 2025-02-12 RX ORDER — PANTOPRAZOLE SODIUM 40 MG/1
40 TABLET, DELAYED RELEASE ORAL DAILY
Qty: 90 TABLET | Refills: 0 | Status: SHIPPED | OUTPATIENT
Start: 2025-02-12

## 2025-05-14 DIAGNOSIS — R10.9 ABDOMINAL PAIN: ICD-10-CM

## 2025-05-14 RX ORDER — PANTOPRAZOLE SODIUM 40 MG/1
40 TABLET, DELAYED RELEASE ORAL DAILY
Qty: 90 TABLET | Refills: 1 | Status: SHIPPED | OUTPATIENT
Start: 2025-05-14